# Patient Record
Sex: FEMALE | Race: BLACK OR AFRICAN AMERICAN | HISPANIC OR LATINO | Employment: UNEMPLOYED | URBAN - METROPOLITAN AREA
[De-identification: names, ages, dates, MRNs, and addresses within clinical notes are randomized per-mention and may not be internally consistent; named-entity substitution may affect disease eponyms.]

---

## 2022-03-11 ENCOUNTER — OFFICE VISIT (OUTPATIENT)
Dept: OBSTETRICS AND GYNECOLOGY | Facility: CLINIC | Age: 32
End: 2022-03-11
Payer: MEDICAID

## 2022-03-11 VITALS
WEIGHT: 115 LBS | HEIGHT: 60 IN | DIASTOLIC BLOOD PRESSURE: 89 MMHG | BODY MASS INDEX: 22.58 KG/M2 | SYSTOLIC BLOOD PRESSURE: 142 MMHG

## 2022-03-11 DIAGNOSIS — Z91.199 NONCOMPLIANCE: ICD-10-CM

## 2022-03-11 DIAGNOSIS — K13.79 MOUTH SORES: ICD-10-CM

## 2022-03-11 DIAGNOSIS — Z11.3 SCREENING FOR STDS (SEXUALLY TRANSMITTED DISEASES): Primary | ICD-10-CM

## 2022-03-11 DIAGNOSIS — A51.0 GENITAL CHANCRE: ICD-10-CM

## 2022-03-11 PROBLEM — F19.11 HISTORY OF DRUG ABUSE: Status: ACTIVE | Noted: 2022-03-11

## 2022-03-11 PROBLEM — I10 HYPERTENSION: Status: ACTIVE | Noted: 2022-03-11

## 2022-03-11 PROBLEM — G89.29 CHRONIC NECK PAIN: Status: ACTIVE | Noted: 2022-03-11

## 2022-03-11 PROBLEM — F32.A DEPRESSIVE DISORDER: Status: ACTIVE | Noted: 2022-03-11

## 2022-03-11 PROBLEM — I35.1 AORTIC VALVE REGURGITATION: Status: ACTIVE | Noted: 2022-03-11

## 2022-03-11 PROBLEM — M54.2 CHRONIC NECK PAIN: Status: ACTIVE | Noted: 2022-03-11

## 2022-03-11 PROBLEM — M41.9 SCOLIOSIS: Status: ACTIVE | Noted: 2022-03-11

## 2022-03-11 PROBLEM — F19.10 POLYSUBSTANCE ABUSE: Status: ACTIVE | Noted: 2022-03-11

## 2022-03-11 PROBLEM — I07.1 MODERATE TRICUSPID VALVE REGURGITATION: Status: ACTIVE | Noted: 2022-03-11

## 2022-03-11 PROBLEM — G43.909 MIGRAINE HEADACHE: Status: ACTIVE | Noted: 2022-03-11

## 2022-03-11 PROBLEM — R56.9 SEIZURE: Status: ACTIVE | Noted: 2022-03-11

## 2022-03-11 PROBLEM — Z72.0 FINDING OF TOBACCO SMOKING BEHAVIOR: Status: ACTIVE | Noted: 2022-03-11

## 2022-03-11 PROBLEM — F41.9 ANXIETY: Status: ACTIVE | Noted: 2022-03-11

## 2022-03-11 PROCEDURE — 87491 CHLMYD TRACH DNA AMP PROBE: CPT

## 2022-03-11 PROCEDURE — 3077F PR MOST RECENT SYSTOLIC BLOOD PRESSURE >= 140 MM HG: ICD-10-PCS | Mod: CPTII,S$GLB,,

## 2022-03-11 PROCEDURE — 1159F PR MEDICATION LIST DOCUMENTED IN MEDICAL RECORD: ICD-10-PCS | Mod: CPTII,S$GLB,,

## 2022-03-11 PROCEDURE — 1160F RVW MEDS BY RX/DR IN RCRD: CPT | Mod: CPTII,S$GLB,,

## 2022-03-11 PROCEDURE — 3008F BODY MASS INDEX DOCD: CPT | Mod: CPTII,S$GLB,,

## 2022-03-11 PROCEDURE — 99203 PR OFFICE/OUTPT VISIT, NEW, LEVL III, 30-44 MIN: ICD-10-PCS | Mod: S$GLB,,,

## 2022-03-11 PROCEDURE — 3077F SYST BP >= 140 MM HG: CPT | Mod: CPTII,S$GLB,,

## 2022-03-11 PROCEDURE — 87591 N.GONORRHOEAE DNA AMP PROB: CPT

## 2022-03-11 PROCEDURE — 3079F DIAST BP 80-89 MM HG: CPT | Mod: CPTII,S$GLB,,

## 2022-03-11 PROCEDURE — 99203 OFFICE O/P NEW LOW 30 MIN: CPT | Mod: S$GLB,,,

## 2022-03-11 PROCEDURE — 1160F PR REVIEW ALL MEDS BY PRESCRIBER/CLIN PHARMACIST DOCUMENTED: ICD-10-PCS | Mod: CPTII,S$GLB,,

## 2022-03-11 PROCEDURE — 3008F PR BODY MASS INDEX (BMI) DOCUMENTED: ICD-10-PCS | Mod: CPTII,S$GLB,,

## 2022-03-11 PROCEDURE — 3079F PR MOST RECENT DIASTOLIC BLOOD PRESSURE 80-89 MM HG: ICD-10-PCS | Mod: CPTII,S$GLB,,

## 2022-03-11 PROCEDURE — 1159F MED LIST DOCD IN RCRD: CPT | Mod: CPTII,S$GLB,,

## 2022-03-11 RX ORDER — AMLODIPINE BESYLATE 5 MG/1
5 TABLET ORAL
COMMUNITY
Start: 2021-05-27

## 2022-03-11 RX ORDER — METOPROLOL TARTRATE 25 MG/1
25 TABLET, FILM COATED ORAL
COMMUNITY
Start: 2021-07-02

## 2022-03-11 NOTE — PROGRESS NOTES
SUBJECTIVE:       Chief Complaint: STD CHECK    History of Present Illness: Shanae Calderon is a 31 y.o. female  presenting for perineal sore and painful mouth sores. Patient states she does not have a history of STD/STI.  She does desire STD/STI testing today.  She was seen at [facility] for a genital sore at the beginning of February and treated for herpes with acyclovir. She states the genital sore has gotten bigger and more painful since. She states she feels safe at home.  Patient was inpatient at [facility] for 1 week last month for depression episode. She states she was tested for STDs at that time and was told she was possibly positive for syphilis. She states she was not treated with any medications or injections at that time. Patient is not currently taking any of her prescribed medications.    Review of Systems   Constitutional: Negative for activity change, appetite change, chills, fatigue, fever and unexpected weight change.   HENT: Positive for mouth sores. Negative for nasal congestion, dental problem, ear pain, postnasal drip, rhinorrhea, sinus pressure/congestion, sneezing and sore throat.    Eyes: Negative for discharge, visual disturbance and eye dryness.   Respiratory: Negative for cough, chest tightness and shortness of breath.    Cardiovascular: Negative for chest pain, palpitations and leg swelling.   Gastrointestinal: Negative for abdominal distention, abdominal pain, change in bowel habit, constipation, diarrhea, nausea, vomiting, reflux and change in bowel habit.   Endocrine: Negative for cold intolerance, heat intolerance, polydipsia and polyuria.   Genitourinary: Positive for vaginal pain (vaginal sore). Negative for difficulty urinating, dyspareunia, dysuria, frequency, genital sores, hot flashes, menstrual irregularity, menstrual problem, pelvic pain, urgency, vaginal bleeding, vaginal discharge and vaginal dryness.   Musculoskeletal: Negative for back pain, myalgias, neck  pain and neck stiffness.   Integumentary:  Negative for rash, breast mass, breast discharge and breast tenderness.   Allergic/Immunologic: Negative for environmental allergies and immunocompromised state.   Neurological: Negative for dizziness, syncope, weakness, light-headedness, numbness and headaches.   Hematological: Negative for adenopathy. Does not bruise/bleed easily.   Psychiatric/Behavioral: Negative for confusion, decreased concentration and sleep disturbance. The patient is not nervous/anxious.    Breast: Negative for mass and tenderness        OBJECTIVE:      BP (!) 142/89 (BP Location: Left arm, Patient Position: Sitting)   Ht 5' (1.524 m)   Wt 52.2 kg (115 lb)   LMP 03/04/2022 (Approximate)   BMI 22.46 kg/m²     Chaperone present.    Physical Exam:   Constitutional: She is oriented to person, place, and time. Vital signs are normal. She appears well-developed and well-nourished. She is cooperative.    HENT:   Head: Normocephalic and atraumatic.   Mouth/Throat: Uvula is midline. Oral lesions (3-4 sores to tongue) present.      Cardiovascular: Normal rate and regular rhythm.     Pulmonary/Chest: Effort normal.                  Musculoskeletal: Moves all extremeties.      Right lower leg: No edema.      Left lower leg: No edema.       Neurological: She is alert and oriented to person, place, and time. GCS eye subscore is 4. GCS verbal subscore is 5. GCS motor subscore is 6.    Skin: Skin is warm and dry. Capillary refill takes less than 2 seconds.    Psychiatric: Her speech is normal and behavior is normal. Mood, affect and thought content normal.         ASSESSMENT:       1. Screening for STDs (sexually transmitted diseases)    2. Mouth sores    3. Genital chancre    4. Noncompliance          PLAN:     Screening for STDs (sexually transmitted diseases)  -     HIV 1/2 Ag/Ab (4th Gen); Future; Expected date: 03/11/2022  -     Hepatitis Panel, Acute; Future; Expected date: 03/11/2022  -     C.  trachomatis/N. gonorrhoeae by AMP DNA [facility]; Urine    Mouth sores  -     RPR; Future; Expected date: 03/11/2022  -     FTA antibodies, IgG and IgM; Future; Expected date: 03/11/2022    Genital chancre  -     RPR; Future; Expected date: 03/11/2022  -     FTA antibodies, IgG and IgM; Future; Expected date: 03/11/2022    Noncompliance    Discussed likely diagnosis of syphilis.  Labs ordered to be completed ASAP.  Will call patient with results when available.  Discussed treatment options if results are positive.  STD counseling provided.  Discussed importance of medication compliance and taking medications as prescribed.  Reassurance provided.  All questions answered.  Patient verbalized understanding.      Follow up if symptoms worsen or fail to improve.

## 2022-03-15 ENCOUNTER — TELEPHONE (OUTPATIENT)
Dept: OBSTETRICS AND GYNECOLOGY | Facility: CLINIC | Age: 32
End: 2022-03-15
Payer: MEDICAID

## 2022-03-17 LAB
C TRACH DNA SPEC QL NAA+PROBE: NOT DETECTED
N GONORRHOEA DNA SPEC QL NAA+PROBE: NOT DETECTED

## 2022-04-26 ENCOUNTER — TELEPHONE (OUTPATIENT)
Dept: OBSTETRICS AND GYNECOLOGY | Facility: CLINIC | Age: 32
End: 2022-04-26
Payer: MEDICAID

## 2022-04-26 NOTE — TELEPHONE ENCOUNTER
Called pt concerning Over Due labs in computer.  Pt states she had them done at [facility].